# Patient Record
(demographics unavailable — no encounter records)

---

## 2024-12-02 NOTE — ASSESSMENT
[FreeTextEntry1] : Assessment: Infection, left second toe.  Plan:   The patient was advised to finish all of the remaining pills of the current dosage of Doxycycline.  The patient was advised to notify the office right away if there was increased redness, pain, swelling or drainage. I covered the wound with Amerigel and sterile dressing.  She will return here in one week.

## 2024-12-02 NOTE — HISTORY OF PRESENT ILLNESS
[FreeTextEntry1] : Josy Sharpvanipraveena returns stating that her left second toe is doing better, but she states that it is still a little bit sore.  She states that she has been taking the antibiotics and has about two days' worth left.  She states that she thinks it is a little bit better.

## 2024-12-02 NOTE — PHYSICAL EXAM
[TextEntry] : The left second toe is less swollen.  There is still an open area distal to the nail bed, but pain upon palpation is diminished.

## 2024-12-05 NOTE — PHYSICAL EXAM
[TextEntry] : On the distal aspect of the left second toe, there is still an open area.  The toe is slightly swollen.  It is painful to light palpation.

## 2024-12-05 NOTE — HISTORY OF PRESENT ILLNESS
[FreeTextEntry1] : Josy Jayla returns stating that the left second toe still hurts.  She completed the antibiotics.  She does not notice a change.  She states at times it is very sore.

## 2024-12-05 NOTE — ASSESSMENT
[FreeTextEntry1] : Assessment: Infection, left second toe.  Plan: Based on the poor response to oral antibiotics, I sent Josy for an MRI of the left forefoot to evaluate the toe for osteomyelitis.  I will follow up with her as soon as the results of the tests are available.

## 2025-02-17 NOTE — PHYSICAL EXAM
[General Appearance - Alert] : alert [General Appearance - In No Acute Distress] : in no acute distress [Neck Appearance] : the appearance of the neck was normal [] : no respiratory distress [No Focal Deficits] : no focal deficits [Oriented To Time, Place, And Person] : oriented to person, place, and time [Affect] : the affect was normal [FreeTextEntry1] : toes appear mottled

## 2025-02-17 NOTE — HISTORY OF PRESENT ILLNESS
[FreeTextEntry1] : 58F smoker was referred to this office for evaluation of toe OM.   Symptoms started during the fall 2024 after injuring her left second toe, ultimately leading to patient removing part of the toenail herself. She was given antibiotics in different opportunities. She saw podiatry in Nov 2024 - prescribed doxy. Ultimately, MR of the left foot was obtained in late Dec showing findings concerning for left second toe distal phalanx OM.   Patient reports that her toes in doing much better now, but she still has some pain.   PMH: Ewig Sarcoma (left thigh)  Social history: Heavy smoker  No regular medications  Allergies:  read meats   2/13/2025 FU - stopped abx about 2 weeks ago due to intolerable GI side effects. Denies worsening pain, swelling or redness on toe since stopping abx. No new wounds. Made appt with Vascular Surgery but had to reschedule. Actively smoking.

## 2025-03-20 NOTE — ASSESSMENT
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 Patient may require surgery to revascularize LLE if 5th digit ulceration does not heal - return to vascular clinic in 2 weeks to review CTA

## 2025-03-20 NOTE — PHYSICAL EXAM
[Respiratory Effort] : normal respiratory effort [Normal Rate and Rhythm] : normal rate and rhythm [Abdomen Tenderness] : ~T ~M No abdominal tenderness [No Rash or Lesion] : No rash or lesion [Alert] : alert [Oriented to Person] : oriented to person [Oriented to Place] : oriented to place [Oriented to Time] : oriented to time [Calm] : calm [de-identified] : no acute distress [FreeTextEntry1] : non palpable pedal pulses, right femoral pulse is palpable left femoral pulse is non palpable [de-identified] : left 5th digit ulceration

## 2025-03-20 NOTE — HISTORY OF PRESENT ILLNESS
[FreeTextEntry1] : 58-year-old F smoker with history of left toe OM. She has been seeing infectious disease and has completed a course of antibiotics. She now has non healing 5th digit ulceration that has been present for several weeks. She is a active smoker. Does not take any daily medications. Smokes marijuana daily. Also has chronic back pain and radiculopathy also with claudication symptoms.

## 2025-03-20 NOTE — PHYSICAL EXAM
[Respiratory Effort] : normal respiratory effort [Normal Rate and Rhythm] : normal rate and rhythm [Abdomen Tenderness] : ~T ~M No abdominal tenderness [No Rash or Lesion] : No rash or lesion [Alert] : alert [Oriented to Person] : oriented to person [Oriented to Place] : oriented to place [Oriented to Time] : oriented to time [Calm] : calm [de-identified] : no acute distress [FreeTextEntry1] : non palpable pedal pulses, right femoral pulse is palpable left femoral pulse is non palpable [de-identified] : left 5th digit ulceration

## 2025-04-07 NOTE — HISTORY OF PRESENT ILLNESS
[FreeTextEntry1] : 58-year-old F smoker with history of left toe OM. She has been seeing infectious disease and has completed a course of antibiotics. She now has non healing 5th digit ulceration that has been present for several weeks. She is a active smoker she states a little less than a pack a day now. Does not take any daily medications. Smokes marijuana daily. Also has chronic back pain and radiculopathy also with claudication symptoms and left foot ischemic rest pain.   [de-identified] : Patient returns for CTA review. She has also started taking daily 81mg aspirin. Has been trying to cut down on smoking. Still smoking at least half a pack a day. The 5th toe ulceration after several weeks has healed, but she still reports forefoot pain and numbness. Her toes also appear cyanotic when elevated and color returns with hanging leg off of the side of the bed.

## 2025-04-07 NOTE — PHYSICAL EXAM
[Respiratory Effort] : normal respiratory effort [Normal Rate and Rhythm] : normal rate and rhythm [No Rash or Lesion] : No rash or lesion [Alert] : alert [Oriented to Person] : oriented to person [Oriented to Place] : oriented to place [Oriented to Time] : oriented to time [Calm] : calm [Abdomen Tenderness] : ~T ~M No abdominal tenderness [de-identified] : no acute distress [FreeTextEntry1] : non palpable pedal pulses, right femoral pulse is palpable left femoral pulse is non palpable [de-identified] : left 5th digit ulceration healed

## 2025-04-07 NOTE — ASSESSMENT
[FreeTextEntry1] : 59 YO F with left lower extremity critical limb ischemia with L foot ischemic rest pain.  - I have personally reviewed the patient's CTA A/P with runoff which demonstrates L EIA high grade stenosis, L CFA high grade stenosis and occluded SFA with AK popliteal artery reconstitution and 3 vessel runoff - I have discussed with the patient risks, benefits and alternatives to left femoral endarterectomy with patch angioplasty and retrograde iliac stent. The risks include but are not limited to bleeding, infection, wound healing complications.  - I have also personally discussed this case with cardiologist Dr. Padilla for the patient to obtain cardiac optimization - continue daily aspirin and smoking cessation  - I have also referred her to plastic surgery for possible muscle flap - will schedule patient for L femoral endarterectomy with patch angioplasty and retrograde iliac stent in 4-6 weeks after cardiac optimization